# Patient Record
Sex: MALE | Race: BLACK OR AFRICAN AMERICAN | URBAN - METROPOLITAN AREA
[De-identification: names, ages, dates, MRNs, and addresses within clinical notes are randomized per-mention and may not be internally consistent; named-entity substitution may affect disease eponyms.]

---

## 2021-05-27 ENCOUNTER — APPOINTMENT (RX ONLY)
Dept: URBAN - METROPOLITAN AREA CLINIC 151 | Facility: CLINIC | Age: 66
Setting detail: DERMATOLOGY
End: 2021-05-27

## 2021-05-27 DIAGNOSIS — L98.8 OTHER SPECIFIED DISORDERS OF THE SKIN AND SUBCUTANEOUS TISSUE: ICD-10-CM

## 2021-05-27 PROCEDURE — ? COUNSELING

## 2021-05-27 PROCEDURE — ? COSMETIC CONSULTATION: FILLERS

## 2021-05-27 PROCEDURE — ? OTHER

## 2021-05-27 ASSESSMENT — LOCATION SIMPLE DESCRIPTION DERM
LOCATION SIMPLE: LEFT CHEEK
LOCATION SIMPLE: RIGHT LIP
LOCATION SIMPLE: RIGHT CHEEK
LOCATION SIMPLE: LEFT LIP

## 2021-05-27 ASSESSMENT — LOCATION ZONE DERM
LOCATION ZONE: LIP
LOCATION ZONE: FACE

## 2021-05-27 ASSESSMENT — LOCATION DETAILED DESCRIPTION DERM
LOCATION DETAILED: RIGHT LOWER CUTANEOUS LIP
LOCATION DETAILED: LEFT SUPERIOR MEDIAL BUCCAL CHEEK
LOCATION DETAILED: LEFT LOWER CUTANEOUS LIP
LOCATION DETAILED: RIGHT SUPERIOR MEDIAL BUCCAL CHEEK
LOCATION DETAILED: LEFT SUPERIOR LATERAL BUCCAL CHEEK
LOCATION DETAILED: RIGHT SUPERIOR LATERAL BUCCAL CHEEK

## 2021-05-27 NOTE — PROCEDURE: OTHER
Other (Free Text): Advised patient one to two syringes of Juvederm Ultra Plus can be split to the marionette lines. Patient states those frown lines do not bother him much, and he is instead most bothered by accordion lines on his cheeks which he would like \"pulled back\" to take away from the brown lines.  Advised patient for what he is asking for, he would best benefit from consulting with a plastic surgeon to consider a lift. Laser resurfacing can also help with those lines.  Discussed with patient he can benefit from microneedling such the SkinPen to help with the texture of his skin and with his scan scarring.  Patient opts to consult with the  about microneedling before he moves forward with anything today.
Note Text (......Xxx Chief Complaint.): This diagnosis correlates with the
Detail Level: Zone
Render Risk Assessment In Note?: no

## 2021-05-28 ENCOUNTER — APPOINTMENT (RX ONLY)
Dept: URBAN - METROPOLITAN AREA CLINIC 151 | Facility: CLINIC | Age: 66
Setting detail: DERMATOLOGY
End: 2021-05-28

## 2021-05-28 DIAGNOSIS — Z41.9 ENCOUNTER FOR PROCEDURE FOR PURPOSES OTHER THAN REMEDYING HEALTH STATE, UNSPECIFIED: ICD-10-CM

## 2021-05-28 PROCEDURE — ? COSMETIC CONSULTATION - MICRO-NEEDLING

## 2021-05-28 ASSESSMENT — LOCATION DETAILED DESCRIPTION DERM: LOCATION DETAILED: RIGHT SUPERIOR LATERAL BUCCAL CHEEK

## 2021-05-28 ASSESSMENT — LOCATION ZONE DERM: LOCATION ZONE: FACE

## 2021-05-28 ASSESSMENT — LOCATION SIMPLE DESCRIPTION DERM: LOCATION SIMPLE: RIGHT CHEEK

## 2022-03-03 ENCOUNTER — APPOINTMENT (RX ONLY)
Dept: URBAN - METROPOLITAN AREA CLINIC 151 | Facility: CLINIC | Age: 67
Setting detail: DERMATOLOGY
End: 2022-03-03

## 2022-03-03 DIAGNOSIS — D22 MELANOCYTIC NEVI: ICD-10-CM

## 2022-03-03 DIAGNOSIS — L91.0 HYPERTROPHIC SCAR: ICD-10-CM

## 2022-03-03 DIAGNOSIS — Z71.89 OTHER SPECIFIED COUNSELING: ICD-10-CM

## 2022-03-03 PROBLEM — D22.62 MELANOCYTIC NEVI OF LEFT UPPER LIMB, INCLUDING SHOULDER: Status: ACTIVE | Noted: 2022-03-03

## 2022-03-03 PROCEDURE — ? PRESCRIPTION

## 2022-03-03 PROCEDURE — ? OTHER

## 2022-03-03 PROCEDURE — ? COUNSELING

## 2022-03-03 PROCEDURE — ? ADDITIONAL NOTES

## 2022-03-03 PROCEDURE — ? FULL BODY SKIN EXAM - DECLINED

## 2022-03-03 PROCEDURE — 99213 OFFICE O/P EST LOW 20 MIN: CPT

## 2022-03-03 RX ORDER — TRIAMCINOLONE ACETONIDE 1 MG/G
CREAM TOPICAL
Qty: 30 | Refills: 2 | Status: ERX | COMMUNITY
Start: 2022-03-03

## 2022-03-03 RX ADMIN — TRIAMCINOLONE ACETONIDE: 1 CREAM TOPICAL at 00:00

## 2022-03-03 ASSESSMENT — LOCATION ZONE DERM: LOCATION ZONE: ARM

## 2022-03-03 ASSESSMENT — LOCATION DETAILED DESCRIPTION DERM: LOCATION DETAILED: LEFT ANTERIOR SHOULDER

## 2022-03-03 ASSESSMENT — LOCATION SIMPLE DESCRIPTION DERM: LOCATION SIMPLE: LEFT SHOULDER

## 2022-07-09 ENCOUNTER — TELEPHONE ENCOUNTER (OUTPATIENT)
Dept: URBAN - METROPOLITAN AREA CLINIC 121 | Facility: CLINIC | Age: 67
End: 2022-07-09

## 2022-07-10 ENCOUNTER — TELEPHONE ENCOUNTER (OUTPATIENT)
Dept: URBAN - METROPOLITAN AREA CLINIC 121 | Facility: CLINIC | Age: 67
End: 2022-07-10

## 2024-02-16 ENCOUNTER — APPOINTMENT (RX ONLY)
Dept: URBAN - METROPOLITAN AREA CLINIC 151 | Facility: CLINIC | Age: 69
Setting detail: DERMATOLOGY
End: 2024-02-16

## 2024-02-16 DIAGNOSIS — L91.0 HYPERTROPHIC SCAR: ICD-10-CM | Status: INADEQUATELY CONTROLLED

## 2024-02-16 PROCEDURE — ? ADDITIONAL NOTES

## 2024-02-16 PROCEDURE — 11900 INJECT SKIN LESIONS </W 7: CPT

## 2024-02-16 PROCEDURE — ? INTRALESIONAL KENALOG

## 2024-02-16 PROCEDURE — ? COUNSELING

## 2024-02-16 ASSESSMENT — LOCATION DETAILED DESCRIPTION DERM: LOCATION DETAILED: LEFT ANTERIOR SHOULDER

## 2024-02-16 ASSESSMENT — LOCATION SIMPLE DESCRIPTION DERM: LOCATION SIMPLE: LEFT SHOULDER

## 2024-02-16 ASSESSMENT — LOCATION ZONE DERM: LOCATION ZONE: ARM

## 2024-02-16 NOTE — PROCEDURE: ADDITIONAL NOTES
Detail Level: Simple
Render Risk Assessment In Note?: no
Additional Notes: Has had scar for 15+ years. Monitoring each visit for change. Patient will come back every 6-8 weeks for an injection

## 2024-02-16 NOTE — PROCEDURE: INTRALESIONAL KENALOG
Concentration Of Kenalog Solution Injected (Mg/Ml): 10.0
Total Volume (Ccs): 1
Consent: The risks of atrophy were reviewed with the patient.
Medical Necessity Clause: This procedure was medically necessary because the lesions that were treated were:
Lot # For Kenalog (Optional): [id number]
X Size Of Lesion In Cm (Optional): 1.5
Kenalog Preparation: Kenalog
Kenalog Type Of Vial: Multiple Dose
Require Ndc Code?: No
How Many Mls Were Removed From The 40 Mg/Ml (5ml) Vial When Preparing The Injectable Solution?: 0
Validate Note Data When Using Inventory: Yes
Detail Level: Detailed
Expiration Date For Kenalog (Optional): 12/2025
Which Kenalog Vial Was Used?: Kenalog 10 mg/ml (5 ml vial)

## 2024-03-09 NOTE — PROCEDURE: FULL BODY SKIN EXAM - DECLINED
Instructions: This plan will send the code FBSD to the PM system.  DO NOT or CHANGE the price.
Price (Do Not Change): 0.00
Detail Level: Simple
Improved